# Patient Record
Sex: MALE | Race: WHITE | HISPANIC OR LATINO | Employment: FULL TIME | URBAN - NONMETROPOLITAN AREA
[De-identification: names, ages, dates, MRNs, and addresses within clinical notes are randomized per-mention and may not be internally consistent; named-entity substitution may affect disease eponyms.]

---

## 2024-01-08 ENCOUNTER — HOSPITAL ENCOUNTER (EMERGENCY)
Facility: HOSPITAL | Age: 24
Discharge: HOME | End: 2024-01-08
Attending: EMERGENCY MEDICINE

## 2024-01-08 VITALS
RESPIRATION RATE: 17 BRPM | TEMPERATURE: 98 F | HEIGHT: 67 IN | HEART RATE: 67 BPM | BODY MASS INDEX: 31.08 KG/M2 | OXYGEN SATURATION: 100 % | SYSTOLIC BLOOD PRESSURE: 134 MMHG | WEIGHT: 198 LBS | DIASTOLIC BLOOD PRESSURE: 67 MMHG

## 2024-01-08 DIAGNOSIS — T15.02XA FOREIGN BODY OF LEFT CORNEA, INITIAL ENCOUNTER: Primary | ICD-10-CM

## 2024-01-08 PROCEDURE — 99283 EMERGENCY DEPT VISIT LOW MDM: CPT

## 2024-01-08 PROCEDURE — 25000003 PHARM REV CODE 250: Performed by: EMERGENCY MEDICINE

## 2024-01-08 RX ORDER — ERYTHROMYCIN 5 MG/G
OINTMENT OPHTHALMIC
Status: COMPLETED | OUTPATIENT
Start: 2024-01-08 | End: 2024-01-08

## 2024-01-08 RX ORDER — PROPARACAINE HYDROCHLORIDE 5 MG/ML
2 SOLUTION/ DROPS OPHTHALMIC
Status: COMPLETED | OUTPATIENT
Start: 2024-01-08 | End: 2024-01-08

## 2024-01-08 RX ORDER — ERYTHROMYCIN 5 MG/G
OINTMENT OPHTHALMIC
Qty: 1 G | Refills: 0 | Status: SHIPPED | OUTPATIENT
Start: 2024-01-08

## 2024-01-08 RX ADMIN — ERYTHROMYCIN: 5 OINTMENT OPHTHALMIC at 07:01

## 2024-01-08 RX ADMIN — PROPARACAINE HYDROCHLORIDE 2 DROP: 5 SOLUTION/ DROPS OPHTHALMIC at 07:01

## 2024-01-08 RX ADMIN — FLUORESCEIN SODIUM 1 EACH: 1 STRIP OPHTHALMIC at 07:01

## 2024-01-08 NOTE — ED PROVIDER NOTES
"Encounter Date: 1/8/2024       History     Chief Complaint   Patient presents with    Eye Problem     Patient presents with swelling and redness to left eye - reports he thinks he got "metal shaving" in his eye at work yesterday. Denies visual disturbances.      24 yo male who works as a  is here via POV with complaint of metallic foreign body L cornea after grinding metal yesterday in windy conditions. He states that he was wearing safety goggles. His last Td was less than 2 years ago. No visual disturbance. Attempted removal at home yesterday with his finger without success. Similar to prior similar episode about 2 years ago.       Review of patient's allergies indicates:  No Known Allergies  No past medical history on file.  No past surgical history on file.  No family history on file.     Review of Systems   Constitutional: Negative.    Eyes:  Positive for pain and redness. Negative for photophobia and visual disturbance.   Respiratory: Negative.     Cardiovascular: Negative.    Gastrointestinal: Negative.    All other systems reviewed and are negative.      Physical Exam     Initial Vitals [01/08/24 0655]   BP Pulse Resp Temp SpO2   134/67 67 17 97.9 °F (36.6 °C) 100 %      MAP       --         Physical Exam    Nursing note and vitals reviewed.  Constitutional: He appears well-developed and well-nourished. He is not diaphoretic. No distress.   HENT:   Head: Normocephalic and atraumatic.   Eyes: EOM are normal. Pupils are equal, round, and reactive to light.       Metallic FB at 10 oclock with dye uptake. No lnodon sign.    Neck: Neck supple.   Normal range of motion.  Cardiovascular:  Normal rate, regular rhythm and intact distal pulses.           Pulmonary/Chest: Breath sounds normal. No respiratory distress. He has no wheezes. He has no rales.   Abdominal: Abdomen is soft. Bowel sounds are normal. He exhibits no distension. There is no abdominal tenderness. There is no rebound.   Musculoskeletal:   "       General: No tenderness or edema. Normal range of motion.      Cervical back: Normal range of motion and neck supple.     Neurological: He is alert and oriented to person, place, and time.   Skin: Skin is warm and dry.   Psychiatric: He has a normal mood and affect. Thought content normal.         ED Course   Procedures  Labs Reviewed - No data to display       Imaging Results    None          Medications   erythromycin 5 mg/gram (0.5 %) ophthalmic ointment (has no administration in time range)   proparacaine 0.5 % ophthalmic solution 2 drop (2 drops Left Eye Given 1/8/24 0715)   fluorescein ophthalmic strip 1 each (1 each Left Eye Given 1/8/24 0715)     Medical Decision Making  Metallic FB removed from L cornea with residual rust ring. Most of the rust but not 100% removed. No visual disturbance. Will need to f/u with optho for definitive care. Discussed follow up requirement. Agrees with plan.     Problems Addressed:  Foreign body of left cornea, initial encounter: acute illness or injury    Risk  Prescription drug management.                                      Clinical Impression:  Final diagnoses:  [T15.02XA] Foreign body of left cornea, initial encounter (Primary)          ED Disposition Condition    Discharge Stable          ED Prescriptions       Medication Sig Dispense Start Date End Date Auth. Provider    erythromycin (ROMYCIN) ophthalmic ointment Place a 1/2 inch ribbon of ointment into the lower eyelid of left eye every 8 hours. 1 g 1/8/2024 -- Yrn Hogan MD          Follow-up Information       Follow up With Specialties Details Why Contact Info    Clement Dhillon MD Ophthalmology Schedule an appointment as soon as possible for a visit   [address] [place] [address] OPHTHALMOLOGY  [facility] [place] [address]  [phone]               Yrn Hogan MD  01/08/24 5934